# Patient Record
Sex: MALE | Race: OTHER | HISPANIC OR LATINO | URBAN - METROPOLITAN AREA
[De-identification: names, ages, dates, MRNs, and addresses within clinical notes are randomized per-mention and may not be internally consistent; named-entity substitution may affect disease eponyms.]

---

## 2020-04-10 ENCOUNTER — INPATIENT (INPATIENT)
Facility: HOSPITAL | Age: 35
LOS: 0 days | Discharge: HOME | DRG: 179 | End: 2020-04-11
Attending: INTERNAL MEDICINE | Admitting: INTERNAL MEDICINE
Payer: MEDICAID

## 2020-04-10 VITALS
DIASTOLIC BLOOD PRESSURE: 76 MMHG | OXYGEN SATURATION: 98 % | HEIGHT: 72 IN | SYSTOLIC BLOOD PRESSURE: 125 MMHG | TEMPERATURE: 98 F | RESPIRATION RATE: 18 BRPM | WEIGHT: 229.94 LBS | HEART RATE: 120 BPM

## 2020-04-10 DIAGNOSIS — R74.0 NONSPECIFIC ELEVATION OF LEVELS OF TRANSAMINASE AND LACTIC ACID DEHYDROGENASE [LDH]: ICD-10-CM

## 2020-04-10 DIAGNOSIS — Z90.49 ACQUIRED ABSENCE OF OTHER SPECIFIED PARTS OF DIGESTIVE TRACT: Chronic | ICD-10-CM

## 2020-04-10 LAB
ALBUMIN SERPL ELPH-MCNC: 4.5 G/DL — SIGNIFICANT CHANGE UP (ref 3.3–5)
ALP SERPL-CCNC: 170 U/L — HIGH (ref 40–120)
ALT FLD-CCNC: 952 U/L — HIGH (ref 10–45)
ANION GAP SERPL CALC-SCNC: 14 MMOL/L — SIGNIFICANT CHANGE UP (ref 5–17)
AST SERPL-CCNC: 767 U/L — HIGH (ref 10–40)
BASOPHILS # BLD AUTO: 0.02 K/UL — SIGNIFICANT CHANGE UP (ref 0–0.2)
BASOPHILS NFR BLD AUTO: 0.3 % — SIGNIFICANT CHANGE UP (ref 0–2)
BILIRUB SERPL-MCNC: 2.4 MG/DL — HIGH (ref 0.2–1.2)
BUN SERPL-MCNC: 10 MG/DL — SIGNIFICANT CHANGE UP (ref 7–23)
CALCIUM SERPL-MCNC: 9.6 MG/DL — SIGNIFICANT CHANGE UP (ref 8.4–10.5)
CHLORIDE SERPL-SCNC: 101 MMOL/L — SIGNIFICANT CHANGE UP (ref 96–108)
CO2 SERPL-SCNC: 21 MMOL/L — LOW (ref 22–31)
CREAT SERPL-MCNC: 0.99 MG/DL — SIGNIFICANT CHANGE UP (ref 0.5–1.3)
CRP SERPL-MCNC: 1.54 MG/DL — HIGH (ref 0–0.4)
D DIMER BLD IA.RAPID-MCNC: 7795 NG/ML DDU — HIGH
EOSINOPHIL # BLD AUTO: 0.03 K/UL — SIGNIFICANT CHANGE UP (ref 0–0.5)
EOSINOPHIL NFR BLD AUTO: 0.4 % — SIGNIFICANT CHANGE UP (ref 0–6)
FERRITIN SERPL-MCNC: 1970 NG/ML — HIGH (ref 30–400)
GLUCOSE SERPL-MCNC: 122 MG/DL — HIGH (ref 70–99)
HCT VFR BLD CALC: 47.2 % — SIGNIFICANT CHANGE UP (ref 39–50)
HGB BLD-MCNC: 16.3 G/DL — SIGNIFICANT CHANGE UP (ref 13–17)
IMM GRANULOCYTES NFR BLD AUTO: 0.4 % — SIGNIFICANT CHANGE UP (ref 0–1.5)
LACTATE SERPL-SCNC: 1.6 MMOL/L — SIGNIFICANT CHANGE UP (ref 0.7–2)
LDH SERPL L TO P-CCNC: 457 U/L — HIGH (ref 50–242)
LYMPHOCYTES # BLD AUTO: 0.31 K/UL — LOW (ref 1–3.3)
LYMPHOCYTES # BLD AUTO: 4.2 % — LOW (ref 13–44)
MCHC RBC-ENTMCNC: 29.6 PG — SIGNIFICANT CHANGE UP (ref 27–34)
MCHC RBC-ENTMCNC: 34.5 GM/DL — SIGNIFICANT CHANGE UP (ref 32–36)
MCV RBC AUTO: 85.8 FL — SIGNIFICANT CHANGE UP (ref 80–100)
MONOCYTES # BLD AUTO: 0.93 K/UL — HIGH (ref 0–0.9)
MONOCYTES NFR BLD AUTO: 12.7 % — SIGNIFICANT CHANGE UP (ref 2–14)
NEUTROPHILS # BLD AUTO: 6.01 K/UL — SIGNIFICANT CHANGE UP (ref 1.8–7.4)
NEUTROPHILS NFR BLD AUTO: 82 % — HIGH (ref 43–77)
NRBC # BLD: 0 /100 WBCS — SIGNIFICANT CHANGE UP (ref 0–0)
PLATELET # BLD AUTO: 228 K/UL — SIGNIFICANT CHANGE UP (ref 150–400)
POTASSIUM SERPL-MCNC: 4.1 MMOL/L — SIGNIFICANT CHANGE UP (ref 3.5–5.3)
POTASSIUM SERPL-SCNC: 4.1 MMOL/L — SIGNIFICANT CHANGE UP (ref 3.5–5.3)
PROCALCITONIN SERPL-MCNC: 0.84 NG/ML — HIGH (ref 0.02–0.1)
PROT SERPL-MCNC: 7.2 G/DL — SIGNIFICANT CHANGE UP (ref 6–8.3)
RBC # BLD: 5.5 M/UL — SIGNIFICANT CHANGE UP (ref 4.2–5.8)
RBC # FLD: 12.3 % — SIGNIFICANT CHANGE UP (ref 10.3–14.5)
SARS-COV-2 RNA SPEC QL NAA+PROBE: SIGNIFICANT CHANGE UP
SODIUM SERPL-SCNC: 136 MMOL/L — SIGNIFICANT CHANGE UP (ref 135–145)
WBC # BLD: 7.33 K/UL — SIGNIFICANT CHANGE UP (ref 3.8–10.5)
WBC # FLD AUTO: 7.33 K/UL — SIGNIFICANT CHANGE UP (ref 3.8–10.5)

## 2020-04-10 PROCEDURE — 76705 ECHO EXAM OF ABDOMEN: CPT | Mod: 26

## 2020-04-10 PROCEDURE — 99221 1ST HOSP IP/OBS SF/LOW 40: CPT | Mod: AI

## 2020-04-10 PROCEDURE — 93010 ELECTROCARDIOGRAM REPORT: CPT

## 2020-04-10 PROCEDURE — 99285 EMERGENCY DEPT VISIT HI MDM: CPT

## 2020-04-10 PROCEDURE — 71045 X-RAY EXAM CHEST 1 VIEW: CPT | Mod: 26

## 2020-04-10 PROCEDURE — 74177 CT ABD & PELVIS W/CONTRAST: CPT | Mod: 26

## 2020-04-10 RX ORDER — PANTOPRAZOLE SODIUM 20 MG/1
40 TABLET, DELAYED RELEASE ORAL
Refills: 0 | Status: DISCONTINUED | OUTPATIENT
Start: 2020-04-10 | End: 2020-04-11

## 2020-04-10 RX ORDER — SODIUM CHLORIDE 9 MG/ML
1000 INJECTION INTRAMUSCULAR; INTRAVENOUS; SUBCUTANEOUS
Refills: 0 | Status: DISCONTINUED | OUTPATIENT
Start: 2020-04-10 | End: 2020-04-11

## 2020-04-10 RX ORDER — MORPHINE SULFATE 50 MG/1
2 CAPSULE, EXTENDED RELEASE ORAL EVERY 6 HOURS
Refills: 0 | Status: DISCONTINUED | OUTPATIENT
Start: 2020-04-10 | End: 2020-04-11

## 2020-04-10 RX ORDER — SODIUM CHLORIDE 9 MG/ML
1000 INJECTION INTRAMUSCULAR; INTRAVENOUS; SUBCUTANEOUS ONCE
Refills: 0 | Status: COMPLETED | OUTPATIENT
Start: 2020-04-10 | End: 2020-04-10

## 2020-04-10 RX ORDER — ACETAMINOPHEN 500 MG
975 TABLET ORAL ONCE
Refills: 0 | Status: COMPLETED | OUTPATIENT
Start: 2020-04-10 | End: 2020-04-10

## 2020-04-10 RX ORDER — ENOXAPARIN SODIUM 100 MG/ML
40 INJECTION SUBCUTANEOUS DAILY
Refills: 0 | Status: DISCONTINUED | OUTPATIENT
Start: 2020-04-10 | End: 2020-04-11

## 2020-04-10 RX ADMIN — SODIUM CHLORIDE 70 MILLILITER(S): 9 INJECTION INTRAMUSCULAR; INTRAVENOUS; SUBCUTANEOUS at 17:55

## 2020-04-10 RX ADMIN — SODIUM CHLORIDE 1000 MILLILITER(S): 9 INJECTION INTRAMUSCULAR; INTRAVENOUS; SUBCUTANEOUS at 13:58

## 2020-04-10 RX ADMIN — SODIUM CHLORIDE 70 MILLILITER(S): 9 INJECTION INTRAMUSCULAR; INTRAVENOUS; SUBCUTANEOUS at 22:59

## 2020-04-10 RX ADMIN — Medication 975 MILLIGRAM(S): at 13:56

## 2020-04-10 NOTE — ED ADULT NURSE NOTE - OBJECTIVE STATEMENT
Pt is a 36 yo M c/o RLQ abdominal pain 8/10. PSH- cholecystectomy. Pt endorses abdominal pain since yesterday and diarrhea since 9AM today after taking "something for constipation" in an attempt to relieve pain, denies N/V. Also took unknown medication for pain (tylenol or motrin) without relief. Pain localized to RLQ no radiation. No tenderness on palpation, no rebound tenderness. Abdomen softly distended. Pt is a A&Ox4, anxious, lung sounds clear, pulses +2, tachycardic to 118, REYES 5/5, skin intact. Pt instructed on use of call bell and to wait for assistance, verbalizes understanding. Side rails up for safety, will continue to monitor. Pt is a 36 yo M c/o RLQ abdominal pain 8/10. PSH- cholecystectomy. Pt endorses abdominal pain since yesterday and diarrhea since 9AM today after taking magnesium citrate in an attempt to relieve pain thinking it was constipation, denies N/V. Endorses taking 2 tylenol for pain without relief. Pain localized to RLQ no radiation. No tenderness on palpation, no rebound tenderness. Abdomen softly distended. Pt is a A&Ox4, anxious, lung sounds clear, pulses +2, tachycardic to 118, REYES 5/5, skin intact. Pt instructed on use of call bell and to wait for assistance, verbalizes understanding. Side rails up for safety, will continue to monitor.

## 2020-04-10 NOTE — ED PROVIDER NOTE - ATTENDING CONTRIBUTION TO CARE
Agree with above, Mandeep Lao MD, FACEP  This patient was seen during the time of the COVID-19 pandemic, the care of this patient was in support of the [facility] countermeasures to COVID-19.   will get iv, cbc, cmp, ultrasound right upper quadrant, analgesia and antiemetic prn, ct a/p with iv contrast  noted elevated lft and bili  concern for choledocholithiasis with possibility for cholangitis vs COVID-19 infection with transaminitis   Will follow up on labs, analgesia, imaging, reassess and disposition to the inpatient team as clinically indicated.   Based on patient's history and physical exam, as well as the results of today's workup, I feel that patient warrants admission to the hospital for further workup/evaluation and continued management. I discussed the findings of today's workup with the patient and addressed the patient's questions and concerns. The patient was agreeable with admission. Our team spoke with the inpatient receiving team who accepted the patient for admission and subsequently took over the patient's care at the time of admission.

## 2020-04-10 NOTE — H&P ADULT - NSHPPHYSICALEXAM_GEN_ALL_CORE
PHYSICAL EXAMINATION:  Vital Signs Last 24 Hrs  T(C): 37.2 (10 Apr 2020 16:30), Max: 37.2 (10 Apr 2020 13:34)  T(F): 98.9 (10 Apr 2020 16:30), Max: 98.9 (10 Apr 2020 13:34)  HR: 102 (10 Apr 2020 16:30) (102 - 120)  BP: 125/77 (10 Apr 2020 16:30) (125/76 - 133/89)  BP(mean): --  RR: 18 (10 Apr 2020 16:30) (18 - 20)  SpO2: 98% (10 Apr 2020 16:30) (98% - 99%)  CAPILLARY BLOOD GLUCOSE            GENERAL: NAD, well-groomed,  HEAD:  atraumatic, normocephalic  EYES: sclera anicteric  ENMT: mucous membranes moist  NECK: supple, No JVD  CHEST/LUNG: clear to auscultation bilaterally;    no      rales   ,   no rhonchi,   HEART: normal S1, S2  ABDOMEN: BS+, soft, ND,  mild  right sided  tenderness/  no guarding/  rebound  EXTREMITIES:    no    edema    b/l LEs  NEURO: awake, ,     moves all extremities  SKIN: no     rash

## 2020-04-10 NOTE — ED PROVIDER NOTE - NS ED ROS FT
GENERAL: No fever or chills  EYES: no change in vision  HEENT: no trouble swallowing or speaking  CARDIAC: no chest pain or palpitations  PULMONARY: no cough or SOB  GI: abd pain, diarrhea   : No changes in urination  SKIN: no rashes  NEURO: no headache, numbness, or weakness  MSK: No joint pain

## 2020-04-10 NOTE — ED PROVIDER NOTE - CLINICAL SUMMARY MEDICAL DECISION MAKING FREE TEXT BOX
Patient presenting abd pain and laxative induced diarrhea. Abd benign, no tenderness, patient well appearing. Noted with tachycardia likely 2/2 dehydration from diarrhea, anxiety. WIll hydrate, check screening labs. Likely self limited benign abd process

## 2020-04-10 NOTE — ED ADULT NURSE NOTE - CHPI ED NUR SYMPTOMS NEG
no nausea/no vomiting/no hematuria/no blood in stool/no burning urination/no chills/no dysuria/no fever

## 2020-04-10 NOTE — ED PROVIDER NOTE - CARE PLAN
Principal Discharge DX:	Transaminitis Principal Discharge DX:	Transaminitis  Secondary Diagnosis:	Abdominal pain

## 2020-04-10 NOTE — H&P ADULT - ASSESSMENT
35M with no pmh,      hx, prior   cholecystectomy,    presenting with episodes of right sided abd pain., yesterday  an d today  denies  fevers/  chills/  cp/  sob/  no  sick contacts,  he  thought he felt constipated and took laxatives and began developing diarrhea today.    pt with normal wbc/  afebrile  elevtaed  LFTs/  ,  CT  abdomen, no pathology found/  normal  ducts/  normal  liver/  fluid  in colon  house  gi  eval    lfts in am/  hepatitis  profile    ast/alt  767/  952/ Alk phos,  170   diarrhea after taking laxatives,  will  monitor   ID  eval   COVID/  inflammatory  markers  in progress  i  fluids.  dvt  ppx      < from: CT Abdomen and Pelvis w/ IV Cont (04.10.20 @ 15:49) >  IMPRESSION:   Fluid-filled throughout the colon, in keeping with the clinical history of diarrhea.  Trace nonspecific free fluid in the pelvis.  Cholecystectomy.   Otherwise unremarkable exam.  < end of copied text > 35M with no pmh,      hx, prior   cholecystectomy,    presenting with episodes of right sided abd pain., yesterday  an d today  denies  fevers/  chills/  cp/  sob/  no  sick contacts,  he  thought he felt constipated and took laxatives and began developing diarrhea today.    pt with normal wbc/  afebrile  elevtaed  LFTs/  ,  CT  abdomen, no pathology found/  normal  ducts/  normal  liver/  fluid  in colon  house  gi  eval    lfts in am/  hepatitis  profile    ast/alt  767/  952/ Alk phos,  170   diarrhea after taking laxatives,  will  monitor   ID  eval   COVID/  inflammatory  markers  in progress  i  fluids.  dvt  ppx      addendum  7.50  pm  Covid  negative/  markers  inflated, including crp/ D dimer/  ferritin, /  procalcitonin   CtA  chest,  ordered  rpt  covid  test,  ordered      < from: CT Abdomen and Pelvis w/ IV Cont (04.10.20 @ 15:49) >  IMPRESSION:   Fluid-filled throughout the colon, in keeping with the clinical history of diarrhea.  Trace nonspecific free fluid in the pelvis.  Cholecystectomy.   Otherwise unremarkable exam.  < end of copied text >

## 2020-04-10 NOTE — H&P ADULT - HISTORY OF PRESENT ILLNESS
35M with no pmh,     hx cholecystectomy presenting with episodes of right sided abd pain.  1 episode yesterday and return of symptoms today . States thought he felt constipated and took laxatives and began developing diarrhea today.  Denies fever, chills, cp, sob, nausea, vomiting, dysuria

## 2020-04-10 NOTE — ED PROVIDER NOTE - OBJECTIVE STATEMENT
35M with no pmh, hx cholecystectomy presenting with episodes of right sided abd pain. 1 episode yesterday and return of symptoms today. States thought he felt constipated and took laxatives and began developing diarrhea today. Denies fever, chills, cp, sob, nausea, vomiting, dysuria

## 2020-04-10 NOTE — H&P ADULT - NSHPLABSRESULTS_GEN_ALL_CORE
LABS:                        16.3   7.33  )-----------( 228      ( 10 Apr 2020 13:30 )             47.2     04-10    136  |  101  |  10  ----------------------------<  122<H>  4.1   |  21<L>  |  0.99    Ca    9.6      10 Apr 2020 13:30    TPro  7.2  /  Alb  4.5  /  TBili  2.4<H>  / < from: CT Abdomen and Pelvis w/ IV Cont (04.10.20 @ 15:49) >    MPRESSION:     Fluid-filled throughout the colon, in keeping with the clinical history of diarrhea.    Trace nonspecific free fluid in the pelvis.    Cholecystectomy.     Otherwise unremarkable exam.                < end of copied text >     DBili  x   /  AST  767<H>  /  ALT  952<H>  /  AlkPhos  170<H>  04-10

## 2020-04-10 NOTE — H&P ADULT - NSHPREVIEWOFSYSTEMS_GEN_ALL_CORE
REVIEW OF SYSTEMS:  GEN: no fever,    no chills  RESP: no SOB,   no cough  CVS: no chest pain,   no palpitations  GI:abdominal pain,   no nausea,   no vomiting,   no constipation,   no diarrhea  : no dysuria,   no frequency  NEURO: no headache,   no dizziness  PSYCH: no depression,   not anxious  Derm : no rash

## 2020-04-10 NOTE — ED PROCEDURE NOTE - PROCEDURE ADDITIONAL DETAILS
POCUS: Emergency Department Focused Ultrasound performed at patient's bedside.  The complete report may be available in PACS, see below for findings.  cbd was not appreciated to be dilated

## 2020-04-11 ENCOUNTER — TRANSCRIPTION ENCOUNTER (OUTPATIENT)
Age: 35
End: 2020-04-11

## 2020-04-11 VITALS
RESPIRATION RATE: 20 BRPM | TEMPERATURE: 98 F | DIASTOLIC BLOOD PRESSURE: 83 MMHG | OXYGEN SATURATION: 98 % | HEART RATE: 96 BPM | SYSTOLIC BLOOD PRESSURE: 118 MMHG

## 2020-04-11 LAB
ALBUMIN SERPL ELPH-MCNC: 4.1 G/DL — SIGNIFICANT CHANGE UP (ref 3.3–5)
ALP SERPL-CCNC: 172 U/L — HIGH (ref 40–120)
ALT FLD-CCNC: 617 U/L — HIGH (ref 10–45)
ANION GAP SERPL CALC-SCNC: 14 MMOL/L — SIGNIFICANT CHANGE UP (ref 5–17)
AST SERPL-CCNC: 230 U/L — HIGH (ref 10–40)
BILIRUB DIRECT SERPL-MCNC: 0.5 MG/DL — HIGH (ref 0–0.2)
BILIRUB INDIRECT FLD-MCNC: 1.2 MG/DL — HIGH (ref 0.2–1)
BILIRUB SERPL-MCNC: 1.7 MG/DL — HIGH (ref 0.2–1.2)
BUN SERPL-MCNC: 7 MG/DL — SIGNIFICANT CHANGE UP (ref 7–23)
CALCIUM SERPL-MCNC: 9 MG/DL — SIGNIFICANT CHANGE UP (ref 8.4–10.5)
CHLORIDE SERPL-SCNC: 103 MMOL/L — SIGNIFICANT CHANGE UP (ref 96–108)
CO2 SERPL-SCNC: 20 MMOL/L — LOW (ref 22–31)
CREAT SERPL-MCNC: 0.97 MG/DL — SIGNIFICANT CHANGE UP (ref 0.5–1.3)
GLUCOSE SERPL-MCNC: 89 MG/DL — SIGNIFICANT CHANGE UP (ref 70–99)
HCT VFR BLD CALC: 41.1 % — SIGNIFICANT CHANGE UP (ref 39–50)
HGB BLD-MCNC: 13.8 G/DL — SIGNIFICANT CHANGE UP (ref 13–17)
MCHC RBC-ENTMCNC: 29.5 PG — SIGNIFICANT CHANGE UP (ref 27–34)
MCHC RBC-ENTMCNC: 33.6 GM/DL — SIGNIFICANT CHANGE UP (ref 32–36)
MCV RBC AUTO: 87.8 FL — SIGNIFICANT CHANGE UP (ref 80–100)
MITOCHONDRIA AB SER-ACNC: SIGNIFICANT CHANGE UP
NRBC # BLD: 0 /100 WBCS — SIGNIFICANT CHANGE UP (ref 0–0)
PLATELET # BLD AUTO: 178 K/UL — SIGNIFICANT CHANGE UP (ref 150–400)
POTASSIUM SERPL-MCNC: 3.9 MMOL/L — SIGNIFICANT CHANGE UP (ref 3.5–5.3)
POTASSIUM SERPL-SCNC: 3.9 MMOL/L — SIGNIFICANT CHANGE UP (ref 3.5–5.3)
PROT SERPL-MCNC: 6.7 G/DL — SIGNIFICANT CHANGE UP (ref 6–8.3)
RBC # BLD: 4.68 M/UL — SIGNIFICANT CHANGE UP (ref 4.2–5.8)
RBC # FLD: 12.7 % — SIGNIFICANT CHANGE UP (ref 10.3–14.5)
SARS-COV-2 RNA SPEC QL NAA+PROBE: DETECTED
SMOOTH MUSCLE AB SER-ACNC: ABNORMAL
SODIUM SERPL-SCNC: 137 MMOL/L — SIGNIFICANT CHANGE UP (ref 135–145)
WBC # BLD: 4.78 K/UL — SIGNIFICANT CHANGE UP (ref 3.8–10.5)
WBC # FLD AUTO: 4.78 K/UL — SIGNIFICANT CHANGE UP (ref 3.8–10.5)

## 2020-04-11 PROCEDURE — 87635 SARS-COV-2 COVID-19 AMP PRB: CPT

## 2020-04-11 PROCEDURE — 99232 SBSQ HOSP IP/OBS MODERATE 35: CPT

## 2020-04-11 PROCEDURE — 80076 HEPATIC FUNCTION PANEL: CPT

## 2020-04-11 PROCEDURE — 83605 ASSAY OF LACTIC ACID: CPT

## 2020-04-11 PROCEDURE — 83615 LACTATE (LD) (LDH) ENZYME: CPT

## 2020-04-11 PROCEDURE — 80048 BASIC METABOLIC PNL TOTAL CA: CPT

## 2020-04-11 PROCEDURE — 86140 C-REACTIVE PROTEIN: CPT

## 2020-04-11 PROCEDURE — 85379 FIBRIN DEGRADATION QUANT: CPT

## 2020-04-11 PROCEDURE — 86038 ANTINUCLEAR ANTIBODIES: CPT

## 2020-04-11 PROCEDURE — 85027 COMPLETE CBC AUTOMATED: CPT

## 2020-04-11 PROCEDURE — 83690 ASSAY OF LIPASE: CPT

## 2020-04-11 PROCEDURE — 86255 FLUORESCENT ANTIBODY SCREEN: CPT

## 2020-04-11 PROCEDURE — 93005 ELECTROCARDIOGRAM TRACING: CPT

## 2020-04-11 PROCEDURE — 71045 X-RAY EXAM CHEST 1 VIEW: CPT

## 2020-04-11 PROCEDURE — 84145 PROCALCITONIN (PCT): CPT

## 2020-04-11 PROCEDURE — 82728 ASSAY OF FERRITIN: CPT

## 2020-04-11 PROCEDURE — 76705 ECHO EXAM OF ABDOMEN: CPT

## 2020-04-11 PROCEDURE — 99285 EMERGENCY DEPT VISIT HI MDM: CPT

## 2020-04-11 PROCEDURE — 71275 CT ANGIOGRAPHY CHEST: CPT

## 2020-04-11 PROCEDURE — 86381 MITOCHONDRIAL ANTIBODY EACH: CPT

## 2020-04-11 PROCEDURE — 80053 COMPREHEN METABOLIC PANEL: CPT

## 2020-04-11 PROCEDURE — 74177 CT ABD & PELVIS W/CONTRAST: CPT

## 2020-04-11 RX ORDER — ENOXAPARIN SODIUM 100 MG/ML
40 INJECTION SUBCUTANEOUS
Refills: 0 | Status: DISCONTINUED | OUTPATIENT
Start: 2020-04-11 | End: 2020-04-11

## 2020-04-11 RX ORDER — HYDROXYCHLOROQUINE SULFATE 200 MG
400 TABLET ORAL EVERY 24 HOURS
Refills: 0 | Status: DISCONTINUED | OUTPATIENT
Start: 2020-04-12 | End: 2020-04-11

## 2020-04-11 RX ORDER — ACETAMINOPHEN 500 MG
2 TABLET ORAL
Qty: 0 | Refills: 0 | DISCHARGE

## 2020-04-11 RX ORDER — ENOXAPARIN SODIUM 100 MG/ML
40 INJECTION SUBCUTANEOUS DAILY
Refills: 0 | Status: DISCONTINUED | OUTPATIENT
Start: 2020-04-11 | End: 2020-04-11

## 2020-04-11 RX ORDER — HYDROXYCHLOROQUINE SULFATE 200 MG
TABLET ORAL
Refills: 0 | Status: DISCONTINUED | OUTPATIENT
Start: 2020-04-11 | End: 2020-04-11

## 2020-04-11 RX ORDER — HYDROXYCHLOROQUINE SULFATE 200 MG
800 TABLET ORAL EVERY 24 HOURS
Refills: 0 | Status: COMPLETED | OUTPATIENT
Start: 2020-04-11 | End: 2020-04-11

## 2020-04-11 RX ADMIN — Medication 800 MILLIGRAM(S): at 13:48

## 2020-04-11 RX ADMIN — PANTOPRAZOLE SODIUM 40 MILLIGRAM(S): 20 TABLET, DELAYED RELEASE ORAL at 04:33

## 2020-04-11 NOTE — CONSULT NOTE ADULT - SUBJECTIVE AND OBJECTIVE BOX
HPI:   Patient is a 35y male who had cholecystectomy and after had some gall stones passed. He was fine until yesterday when he developed abdomen pain, came to the hospital and found to have liver enzyme elevation . He had an us with no stones or dilated ducts seen and the ct abd and pelvis showed no hepatobiliary changes except cholecystectomy. CT chest no infiltrates. He has high inflammatory parameters and covid sent and is positive on second test. He currently feels fine. No sob, cp, n,v,d,ha, body aches. His abdo pain resolved.     REVIEW OF SYSTEMS:  All other review of systems negative (Comprehensive ROS)    PAST MEDICAL & SURGICAL HISTORY:  No pertinent past medical history  History of cholecystectomy      Allergies    No Known Allergies    Intolerances        Antimicrobials Day #  :  hydroxychloroquine   Oral     Other Medications:  enoxaparin Injectable 40 milliGRAM(s) SubCutaneous daily  pantoprazole    Tablet 40 milliGRAM(s) Oral before breakfast      FAMILY HISTORY:      SOCIAL HISTORY:  Smoking: [ ]Yes [ x]No  ETOH: [ ]Yes [ ]xNo  Drug Use: [ ]Yes [ x]No   [ ] Single[ ]x    T(F): 98 (04-11-20 @ 12:42), Max: 98.9 (04-10-20 @ 16:30)  HR: 96 (04-11-20 @ 12:42)  BP: 118/83 (04-11-20 @ 12:42)  RR: 20 (04-11-20 @ 12:42)  SpO2: 98% (04-11-20 @ 12:42)  Wt(kg): --    PHYSICAL EXAM:  General: alert, no acute distress  Eyes:  anicteric, no conjunctival injection, no discharge  Oropharynx: no lesions or injection 	  Neck: supple, without adenopathy  Lungs: clear to auscultation  Heart: regular rate and rhythm; no murmur, rubs or gallops  Abdomen: soft, nondistended, nontender, without mass or organomegaly  Skin: no lesions  Extremities: no clubbing, cyanosis, or edema  Neurologic: alert, oriented, moves all extremities    LAB RESULTS:                        13.8   4.78  )-----------( 178      ( 11 Apr 2020 06:42 )             41.1     04-11    137  |  103  |  7   ----------------------------<  89  3.9   |  20<L>  |  0.97    Ca    9.0      11 Apr 2020 06:42    TPro  6.7  /  Alb  4.1  /  TBili  1.7<H>  /  DBili  0.5<H>  /  AST  230<H>  /  ALT  617<H>  /  AlkPhos  172<H>  04-11    LIVER FUNCTIONS - ( 11 Apr 2020 06:42 )  Alb: 4.1 g/dL / Pro: 6.7 g/dL / ALK PHOS: 172 U/L / ALT: 617 U/L / AST: 230 U/L / GGT: x         Ferritin, Serum: 1970 ng/mL (04.10.20 @ 19:00)    C-Reactive Protein, Serum: 1.54 mg/dL (04.10.20 @ 19:00)    D-Dimer Assay, Quantitative: 7795: D-Dimer result less than 230 ng/mL DDU correlates with the absence  of thrombosis in a patient with a low and moderate       pre-test probability of thrombosis.  1 DDU is approximately equal to  2 ng/mL FEU (previous units). ng/mL DDU (04.10.20 @ 17:00)            MICROBIOLOGY:  RECENT CULTURES:        RADIOLOGY REVIEWED:  < from: CT Abdomen and Pelvis w/ IV Cont (04.10.20 @ 15:49) >  XAM:  CT ABDOMEN AND PELVIS IC                            PROCEDURE DATE:  04/10/2020            INTERPRETATION:  CLINICAL INFORMATION: Right-sided abdominal pain. Abnormal liver function tests. Cholecystectomy 1 year ago. Diarrhea.    COMPARISON:None.    PROCEDURE:   CT of the Abdomen and Pelvis was performed with intravenous contrast.   Intravenous contrast: 90 ml Omnipaque 350. 10 ml discarded.  Oral contrast: None.  Sagittal and coronal reformats were performed.    FINDINGS:    LOWER CHEST: Within normal limits.    LIVER: Within normal limits.  BILE DUCTS: Normal caliber.  GALLBLADDER: Cholecystectomy.  SPLEEN: Within normal limits.  PANCREAS: Within normal limits.  ADRENALS: Within normal limits.  KIDNEYS/URETERS: Within normal limits.    BLADDER: Within normal limits.  REPRODUCTIVE ORGANS: Prostate within normal limits.    BOWEL: No bowel obstruction. Appendix is normal. Fluid is present in the colon extending from the cecum to the rectum.  PERITONEUM: Trace free fluid in the pelvis.  VESSELS: Within normal limits.  RETROPERITONEUM/LYMPH NODES: No lymphadenopathy.    ABDOMINAL WALL: Within normal limits.  BONES: Within normal limits.    IMPRESSION:     Fluid-filled throughout the colon, in keeping with the clinical history of diarrhea.    Trace nonspecific free fluid in the pelvis.    Cholecystectomy.     < end of copied text >    < from: CT Angio Chest w/ IV Cont (04.10.20 @ 21:21) >    EXAM:  CT ANGIO CHEST (W)AW IC                            PROCEDURE DATE:  04/10/2020            INTERPRETATION:  Clinical information: Evaluate for pulmonary embolus.    CT angiogram of the chest was obtained following administration of intravenous contrast. Approximately 65 cc of Omnipaque 350 was administered and 45 cc was discarded. Coronal, sagittal and MIP images were submitted for review.    No hilar and/or mediastinal adenopathy is noted.     Heart is normal in size. No pericardial effusion is noted. Pulmonary arteries are normal in caliber. Evaluation of the pulmonary arteries is limited due to transient interruption of contrast.     No endobronchial lesions are noted. Lungs are clear. No pleural effusions are noted.    For interpretation of the abdominal contents, please see the report of the CT scan of the abdomen performed earlier on the same day.     Visualized osseous structures are within normal limits.    Impression: Nondiagnostic evaluation of the pulmonary arteries dueto transient interruption of contrast during the acquisition of images. A repeat CT angiogram is recommended for further evaluation after renal function has been assessed.    < from: US Abdomen Limited (ED) (04.10.20 @ 16:57) >  Procedure was performed in the Emergency Department by a credentialed Emergency Medicine Attending Physician    EXAM:  ER US ABDOMEN LTD      ORDER COMMENTS:      PROCEDURE DATE:  04/10/2020    FOCUSED ED ULTRASOUND REPORT          INTERPRETATION:Grayscale imaging of the right upper quadrant was performed.    The gallbladder was visualized and scanned in longitudinal and transverse planes.  The patient was status post cholecystectomy.  The common bile duct measured approximately 0.4 cm.      IMPRESSION:The common bile duct was not noted to be dilated.        < end of copied text >      < end of copied text >        Impression:  Patient with covid infection , presented with hepatitis as main issue. It is possible he passed a gall stone and now liver enzymes are improved and he has early covid infection with high d dimer and ferritin and lymphopenia. This could be the best time to treat a patient  since it is likely the viral phase as opposed to the inflammatory phase.   Recommendations:  would favor 5 days total of hydroxychloroquin  He is for discharge but must f/u labs with his pcp, avoid etoh and any other potential hepatotoxins  needs 4 more days of 400mg daily plaquenil to start tomorrow since he got a dose today  must return if any sob, fever, recurrent pain.

## 2020-04-11 NOTE — DISCHARGE NOTE PROVIDER - HOSPITAL COURSE
35M with no pmh, hx cholecystectomy presenting with episodes of right sided abd pain. Patient reported he felt constipated took laxative began to develop diarrhea. Elevated d-dimer CTA negative for PE. Elevated LFTs. Covid-19 positive. Afebrile. No oxygen requirements. Room air O2 sat 98%. Started on Plaquenil. Cleared for discharge by attending.

## 2020-04-11 NOTE — PROGRESS NOTE ADULT - ASSESSMENT
35M with no pmh,      hx, prior   cholecystectomy,    presenting with episodes of right sided abd pain., yesterday  an d today  denies  fevers/  chills/  cp/  sob/  no  sick contacts,  he  thought he felt constipated and took laxatives and began developing diarrhea today.    pt with normal wbc/  afebrile  elevtaed  LFTs/  ,  CT  abdomen, no pathology found/  normal  ducts/  normal  liver/  fluid  in colon  house  gi  eval,   follow  lfts/  hepatitis  profile    ast/alt  767/  952/ Alk phos,  170   diarrhea after taking laxatives,    COVID  positive,   inflammatory  markers  , now  positive    iv   fluids.  dvt  ppx  ct  a  chest,  non diagnostic/  no   indication  to repeat, now  that  pt is  Covid  psoitive    rx  plan per  ID  lovenox  bid/  BMI  over 30      < from: CT Abdomen and Pelvis w/ IV Cont (04.10.20 @ 15:49) >  IMPRESSION:   Fluid-filled throughout the colon, in keeping with the clinical history of diarrhea.  Trace nonspecific free fluid in the pelvis.  Cholecystectomy.   Otherwise unremarkable exam.  < end of copied text > 35M with no pmh,      hx, prior   cholecystectomy,    presenting with episodes of right sided abd pain., yesterday  an d today  denies  fevers/  chills/  cp/  sob/  no  sick contacts,  he  thought he felt constipated and took laxatives and began developing diarrhea today.    pt with normal wbc/  afebrile  elevtaed  LFTs/  ,  CT  abdomen, no pathology found/  normal  ducts/  normal  liver/  fluid  in colon  house  gi  eval,   follow  lfts/  hepatitis  profile    ast/alt  767/  952/ Alk phos,  170   diarrhea after taking laxatives,    COVID  positive,   inflammatory  markers  , now  positive    iv   fluids.  dvt  ppx/  lovenox  ct  a  chest,  non diagnostic/  no   indication  to repeat, now  that  pt is  Covid  positive  and    room air  sat is  98    rx  plan per  ID        < from: CT Abdomen and Pelvis w/ IV Cont (04.10.20 @ 15:49) >  IMPRESSION:   Fluid-filled throughout the colon, in keeping with the clinical history of diarrhea.  Trace nonspecific free fluid in the pelvis.  Cholecystectomy.   Otherwise unremarkable exam.  < end of copied text > 35M with no pmh,      hx, prior   cholecystectomy,    presenting with episodes of right sided abd pain., yesterday  an d today  denies  fevers/  chills/  cp/  sob/  no  sick contacts,  he  thought he felt constipated and took laxatives and began developing diarrhea today.    pt with normal wbc/  afebrile  elevtaed  LFTs/  ,  CT  abdomen, no pathology found/  normal  ducts/  normal  liver/  fluid  in colon    follow  lfts/  hepatitis  profile    ast/alt  767/  952/ Alk phos,  170   diarrhea after taking laxatives, resolved  now    COVID  positive,   inflammatory  markers  , now  positive    iv   fluids.  dvt  ppx/  lovenox  ct  a  chest,  non diagnostic/  no   indication  to repeat, now  that  pt is  Covid  positive  and    room air  sat is  98    rx  plan per  ID  pt  wants to  go  home    d/c  after seen by ID        < from: CT Abdomen and Pelvis w/ IV Cont (04.10.20 @ 15:49) >  IMPRESSION:   Fluid-filled throughout the colon, in keeping with the clinical history of diarrhea.  Trace nonspecific free fluid in the pelvis.  Cholecystectomy.   Otherwise unremarkable exam.  < end of copied text >

## 2020-04-11 NOTE — DISCHARGE NOTE PROVIDER - NSDCMRMEDTOKEN_GEN_ALL_CORE_FT
elderberry: orally once a day  Tylenol 325 mg oral tablet: 2 tab(s) orally every 4 hours, As Needed elderberry: orally once a day elderberry: orally once a day  hydroxychloroquine 200 mg oral tablet: 400 milligram(s) orally once a day x 4 days   START ON 4/12 (Received dose today 4/11)

## 2020-04-11 NOTE — DISCHARGE NOTE PROVIDER - NSDCCPCAREPLAN_GEN_ALL_CORE_FT
PRINCIPAL DISCHARGE DIAGNOSIS  Diagnosis: COVID-19 virus detected  Assessment and Plan of Treatment: Continue Plaquenil      SECONDARY DISCHARGE DIAGNOSES  Diagnosis: Abdominal pain  Assessment and Plan of Treatment: resolved    Diagnosis: Transaminitis  Assessment and Plan of Treatment: Follow up with PCP for lab work PRINCIPAL DISCHARGE DIAGNOSIS  Diagnosis: COVID-19 virus detected  Assessment and Plan of Treatment: Continue Plaquenil      SECONDARY DISCHARGE DIAGNOSES  Diagnosis: Abdominal pain  Assessment and Plan of Treatment: resolved    Diagnosis: Transaminitis  Assessment and Plan of Treatment: Follow up with PCP for lab work  Avoid alchohol consumption PRINCIPAL DISCHARGE DIAGNOSIS  Diagnosis: COVID-19 virus detected  Assessment and Plan of Treatment: Continue Plaquenil  Self-isolate for two week      SECONDARY DISCHARGE DIAGNOSES  Diagnosis: Abdominal pain  Assessment and Plan of Treatment: resolved    Diagnosis: Transaminitis  Assessment and Plan of Treatment: Follow up with PCP for lab work  Avoid alchohol consumption

## 2020-04-11 NOTE — DISCHARGE NOTE NURSING/CASE MANAGEMENT/SOCIAL WORK - PATIENT PORTAL LINK FT
You can access the FollowMyHealth Patient Portal offered by [facility] by registering at the following website: [email or web address]. By joining [facility]’s FollowMyHealth portal, you will also be able to view your health information using other applications (apps) compatible with our system.

## 2020-04-11 NOTE — PROGRESS NOTE ADULT - SUBJECTIVE AND OBJECTIVE BOX
afebrile    REVIEW OF SYSTEMS:  GEN: no fever,    no chills  RESP: no SOB,   no cough  CVS: no chest pain,   no palpitations  GI: no abdominal pain,   no nausea,   no vomiting,   no constipation,   no diarrhea  : no dysuria,   no frequency  NEURO: no headache,   no dizziness  PSYCH: no depression,   not anxious  Derm : no rash    MEDICATIONS  (STANDING):  enoxaparin Injectable 40 milliGRAM(s) SubCutaneous daily  pantoprazole    Tablet 40 milliGRAM(s) Oral before breakfast  sodium chloride 0.9%. 1000 milliLiter(s) (70 mL/Hr) IV Continuous <Continuous>    MEDICATIONS  (PRN):  morphine  - Injectable 2 milliGRAM(s) IV Push every 6 hours PRN Moderate Pain (4 - 6)      Vital Signs Last 24 Hrs  T(C): 37.1 (11 Apr 2020 04:30), Max: 37.2 (10 Apr 2020 13:34)  T(F): 98.8 (11 Apr 2020 04:30), Max: 98.9 (10 Apr 2020 13:34)  HR: 100 (11 Apr 2020 04:30) (100 - 123)  BP: 111/68 (11 Apr 2020 04:30) (111/68 - 133/89)  BP(mean): --  RR: 18 (11 Apr 2020 04:30) (17 - 20)  SpO2: 97% (11 Apr 2020 04:30) (95% - 99%)  CAPILLARY BLOOD GLUCOSE        I&O's Summary    10 Apr 2020 07:01  -  11 Apr 2020 07:00  --------------------------------------------------------  IN: 910 mL / OUT: 300 mL / NET: 610 mL        PHYSICAL EXAM:  HEAD:  Atraumatic, Normocephalic  NECK: Supple, No   JVD  CHEST/LUNG:   no     rales,     no,    rhonchi  HEART: Regular rate and rhythm;         murmur  ABDOMEN: Soft, Nontender, ;   EXTREMITIES:  no      edema  NEUROLOGY:  alert    LABS:                        13.8   4.78  )-----------( 178      ( 11 Apr 2020 06:42 )             41.1     04-11    137  |  103  |  7   ----------------------------<  89  3.9   |  20<L>  |  0.97    Ca    9.0      11 Apr 2020 06:42    TPro  6.7  /  Alb  4.1  /  TBili  1.7<H>  /  DBili  0.5<H>  /  AST  230<H>  /  ALT  617<H>  /  AlkPhos  172<H>  04-11            Lactate, Blood: 1.6 mmol/L (04-10 @ 17:53)                  Consultant(s) Notes Reviewed:      Care Discussed with Consultants/Other Providers: afebrile/  appears well   no  complaints    wants to go home    REVIEW OF SYSTEMS:  GEN: no fever,    no chills  RESP: no SOB,   no cough  CVS: no chest pain,   no palpitations  GI: no abdominal pain,   no nausea,   no vomiting,   no constipation,   no diarrhea  : no dysuria,   no frequency  NEURO: no headache,   no dizziness  PSYCH: no depression,   not anxious  Derm : no rash    MEDICATIONS  (STANDING):  enoxaparin Injectable 40 milliGRAM(s) SubCutaneous daily  pantoprazole    Tablet 40 milliGRAM(s) Oral before breakfast  sodium chloride 0.9%. 1000 milliLiter(s) (70 mL/Hr) IV Continuous <Continuous>    MEDICATIONS  (PRN):  morphine  - Injectable 2 milliGRAM(s) IV Push every 6 hours PRN Moderate Pain (4 - 6)      Vital Signs Last 24 Hrs  T(C): 37.1 (11 Apr 2020 04:30), Max: 37.2 (10 Apr 2020 13:34)  T(F): 98.8 (11 Apr 2020 04:30), Max: 98.9 (10 Apr 2020 13:34)  HR: 100 (11 Apr 2020 04:30) (100 - 123)  BP: 111/68 (11 Apr 2020 04:30) (111/68 - 133/89)  BP(mean): --  RR: 18 (11 Apr 2020 04:30) (17 - 20)  SpO2: 97% (11 Apr 2020 04:30) (95% - 99%)  CAPILLARY BLOOD GLUCOSE        I&O's Summary    10 Apr 2020 07:01  -  11 Apr 2020 07:00  --------------------------------------------------------  IN: 910 mL / OUT: 300 mL / NET: 610 mL        PHYSICAL EXAM:  HEAD:  Atraumatic, Normocephalic  NECK: Supple, No   JVD  CHEST/LUNG:   no     rales,     no,    rhonchi  HEART: Regular rate and rhythm;         murmur  ABDOMEN: Soft, Nontender, ;   EXTREMITIES:  no      edema  NEUROLOGY:  alert    LABS:                        13.8   4.78  )-----------( 178      ( 11 Apr 2020 06:42 )             41.1     04-11    137  |  103  |  7   ----------------------------<  89  3.9   |  20<L>  |  0.97    Ca    9.0      11 Apr 2020 06:42    TPro  6.7  /  Alb  4.1  /  TBili  1.7<H>  /  DBili  0.5<H>  /  AST  230<H>  /  ALT  617<H>  /  AlkPhos  172<H>  04-11            Lactate, Blood: 1.6 mmol/L (04-10 @ 17:53)                  Consultant(s) Notes Reviewed:      Care Discussed with Consultants/Other Providers:

## 2020-04-11 NOTE — DISCHARGE NOTE PROVIDER - NSDCFUADDAPPT_GEN_ALL_CORE_FT
[facility] will follow up with you [facility] will follow up with you  Follow up with PCP Kulara Water will follow up with you in the next few weeks  [facility] [phone] [employer] will follow up with you in the next few weeks  [facility] [phone]

## 2020-04-12 RX ORDER — HYDROXYCHLOROQUINE SULFATE 200 MG
400 TABLET ORAL
Qty: 1600 | Refills: 0
Start: 2020-04-12 | End: 2020-04-15

## 2020-04-13 LAB — ANA TITR SER: NEGATIVE — SIGNIFICANT CHANGE UP

## 2022-09-19 PROBLEM — Z78.9 OTHER SPECIFIED HEALTH STATUS: Chronic | Status: ACTIVE | Noted: 2020-04-10

## 2022-09-30 PROBLEM — Z00.00 ENCOUNTER FOR PREVENTIVE HEALTH EXAMINATION: Status: ACTIVE | Noted: 2022-09-30

## 2022-10-03 ENCOUNTER — APPOINTMENT (OUTPATIENT)
Dept: GASTROENTEROLOGY | Facility: CLINIC | Age: 37
End: 2022-10-03

## 2022-10-03 VITALS
HEIGHT: 70 IN | OXYGEN SATURATION: 98 % | DIASTOLIC BLOOD PRESSURE: 87 MMHG | TEMPERATURE: 98.3 F | WEIGHT: 220 LBS | SYSTOLIC BLOOD PRESSURE: 126 MMHG | HEART RATE: 81 BPM | BODY MASS INDEX: 31.5 KG/M2

## 2022-10-03 DIAGNOSIS — R10.9 UNSPECIFIED ABDOMINAL PAIN: ICD-10-CM

## 2022-10-03 DIAGNOSIS — R10.11 RIGHT UPPER QUADRANT PAIN: ICD-10-CM

## 2022-10-03 PROCEDURE — 99204 OFFICE O/P NEW MOD 45 MIN: CPT

## 2022-10-03 NOTE — ASSESSMENT
[FreeTextEntry1] : Gallstone induced acute cholecystitis and pancreatitis status postcholecystectomy 4 years ago presenting with on and off mild to moderate right upper quadrant discomfort, will rule out sludge versus  residual stones.\par

## 2022-10-03 NOTE — HISTORY OF PRESENT ILLNESS
[FreeTextEntry1] : Theron Edmonds is a 37-year-old  male  at [facility] had gallstone induced acute cholecystitis 4 years ago and had cholecystectomy at [facility].  A day later he developed right upper quadrant pain and fever found to have acute pancreatitis related to CBD stones.  Same hospitalization he had ERCP and stone extractions possible sphincterotomy.  Recovered in 7 days without any complications.  Since then he complains of mild to moderate right upper quadrant pain radiating to the back but without any nausea, vomiting, fever, chills or any episodes of jaundice.  His appetite and weight are stable since then.  He was also told to have high triglycerides and advised dietary changes.  No family history of hepatal biliary malignancies.  No history of GI cancer\par He has been drinking alcohol on a daily basis for the past 20 years mostly on weekends\par He smokes weed and [place]  occasionally

## 2022-10-03 NOTE — PHYSICAL EXAM
[Alert] : alert [Normal Voice/Communication] : normal voice/communication [Healthy Appearing] : healthy appearing [No Acute Distress] : no acute distress [Sclera] : the sclera and conjunctiva were normal [Hearing Threshold Finger Rub Not [place]] : hearing was normal [Normal Lips/Gums] : the lips and gums were normal [Oropharynx] : the oropharynx was normal [Normal Appearance] : the appearance of the neck was normal [No Neck Mass] : no neck mass was observed [No Respiratory Distress] : no respiratory distress [No Acc Muscle Use] : no accessory muscle use [Respiration, Rhythm And Depth] : normal respiratory rhythm and effort [Auscultation Breath Sounds / Voice Sounds] : lungs were clear to auscultation bilaterally [Heart Rate And Rhythm] : heart rate was normal and rhythm regular [Normal S1, S2] : normal S1 and S2 [Murmurs] : no murmurs [None] : no edema [Bowel Sounds] : normal bowel sounds [Abdomen Tenderness] : non-tender [No Masses] : no abdominal mass palpated [Abdomen Soft] : soft [] : no hepatosplenomegaly [Ascites: ___] : no ascites [Rebound Tenderness] : no rebound tenderness [Supraclavicular Lymph Nodes Enlarged Bilaterally] : no supraclavicular lymphadenopathy [Cervical Lymph Nodes Enlarged Anterior Bilaterally] : no anterior cervical lymphadenopathy [Normal Color / Pigmentation] : normal skin color and pigmentation [No Focal Deficits] : no focal deficits [Motor Exam] : the motor exam was normal [Oriented To Time, Place, And Person] : oriented to person, place, and time [de-identified] : obese

## 2022-10-04 LAB
ALBUMIN SERPL ELPH-MCNC: 4.7 G/DL
ALP BLD-CCNC: 101 U/L
ALT SERPL-CCNC: 27 U/L
AMYLASE/CREAT SERPL: 57 U/L
ANION GAP SERPL CALC-SCNC: 9 MMOL/L
AST SERPL-CCNC: 15 U/L
BASOPHILS # BLD AUTO: 0.03 K/UL
BASOPHILS NFR BLD AUTO: 0.6 %
BILIRUB SERPL-MCNC: 0.6 MG/DL
BUN SERPL-MCNC: 13 MG/DL
CALCIUM SERPL-MCNC: 9.6 MG/DL
CHLORIDE SERPL-SCNC: 104 MMOL/L
CO2 SERPL-SCNC: 26 MMOL/L
CREAT SERPL-MCNC: 1.02 MG/DL
EGFR: 98 ML/MIN/1.73M2
EOSINOPHIL # BLD AUTO: 0.25 K/UL
EOSINOPHIL NFR BLD AUTO: 5.4 %
GGT SERPL-CCNC: 29 U/L
GLUCOSE SERPL-MCNC: 94 MG/DL
HBV CORE IGG+IGM SER QL: NONREACTIVE
HBV SURFACE AB SER QL: REACTIVE
HBV SURFACE AG SER QL: NONREACTIVE
HCT VFR BLD CALC: 44.4 %
HCV RNA SERPL NAA+PROBE-LOG IU: NOT DETECTED LOGIU/ML
HEPATITIS A IGG ANTIBODY: NONREACTIVE
HEPC RNA INTERP: NOT DETECTED
HGB BLD-MCNC: 15.9 G/DL
IMM GRANULOCYTES NFR BLD AUTO: 0.6 %
LPL SERPL-CCNC: 30 U/L
LYMPHOCYTES # BLD AUTO: 1.44 K/UL
LYMPHOCYTES NFR BLD AUTO: 31.1 %
MAN DIFF?: NORMAL
MCHC RBC-ENTMCNC: 31 PG
MCHC RBC-ENTMCNC: 35.8 GM/DL
MCV RBC AUTO: 86.5 FL
MONOCYTES # BLD AUTO: 0.59 K/UL
MONOCYTES NFR BLD AUTO: 12.7 %
NEUTROPHILS # BLD AUTO: 2.29 K/UL
NEUTROPHILS NFR BLD AUTO: 49.6 %
PLATELET # BLD AUTO: 213 K/UL
POTASSIUM SERPL-SCNC: 4.7 MMOL/L
PROT SERPL-MCNC: 6.9 G/DL
RBC # BLD: 5.13 M/UL
RBC # FLD: 12.8 %
SODIUM SERPL-SCNC: 139 MMOL/L
TRIGL SERPL-MCNC: 199 MG/DL
WBC # FLD AUTO: 4.63 K/UL

## 2022-10-24 ENCOUNTER — OUTPATIENT (OUTPATIENT)
Dept: OUTPATIENT SERVICES | Facility: HOSPITAL | Age: 37
LOS: 1 days | End: 2022-10-24
Payer: MEDICAID

## 2022-10-24 ENCOUNTER — APPOINTMENT (OUTPATIENT)
Dept: ULTRASOUND IMAGING | Facility: IMAGING CENTER | Age: 37
End: 2022-10-24

## 2022-10-24 DIAGNOSIS — Z90.49 ACQUIRED ABSENCE OF OTHER SPECIFIED PARTS OF DIGESTIVE TRACT: Chronic | ICD-10-CM

## 2022-10-24 DIAGNOSIS — R10.11 RIGHT UPPER QUADRANT PAIN: ICD-10-CM

## 2022-10-24 PROCEDURE — 76700 US EXAM ABDOM COMPLETE: CPT | Mod: 26

## 2022-10-24 PROCEDURE — 76700 US EXAM ABDOM COMPLETE: CPT

## 2022-11-11 ENCOUNTER — NON-APPOINTMENT (OUTPATIENT)
Age: 37
End: 2022-11-11

## 2022-11-15 ENCOUNTER — RESULT REVIEW (OUTPATIENT)
Age: 37
End: 2022-11-15

## 2022-11-15 ENCOUNTER — APPOINTMENT (OUTPATIENT)
Dept: GASTROENTEROLOGY | Facility: HOSPITAL | Age: 37
End: 2022-11-15

## 2022-11-15 ENCOUNTER — NON-APPOINTMENT (OUTPATIENT)
Age: 37
End: 2022-11-15

## 2022-11-15 ENCOUNTER — OUTPATIENT (OUTPATIENT)
Dept: OUTPATIENT SERVICES | Facility: HOSPITAL | Age: 37
LOS: 1 days | End: 2022-11-15
Payer: MEDICAID

## 2022-11-15 ENCOUNTER — TRANSCRIPTION ENCOUNTER (OUTPATIENT)
Age: 37
End: 2022-11-15

## 2022-11-15 VITALS
DIASTOLIC BLOOD PRESSURE: 86 MMHG | OXYGEN SATURATION: 99 % | WEIGHT: 220.02 LBS | SYSTOLIC BLOOD PRESSURE: 131 MMHG | TEMPERATURE: 98 F | HEART RATE: 90 BPM | RESPIRATION RATE: 10 BRPM | HEIGHT: 70 IN

## 2022-11-15 VITALS
SYSTOLIC BLOOD PRESSURE: 108 MMHG | DIASTOLIC BLOOD PRESSURE: 77 MMHG | RESPIRATION RATE: 18 BRPM | OXYGEN SATURATION: 96 % | HEART RATE: 79 BPM

## 2022-11-15 DIAGNOSIS — K21.9 GASTRO-ESOPHAGEAL REFLUX DISEASE WITHOUT ESOPHAGITIS: ICD-10-CM

## 2022-11-15 DIAGNOSIS — Z90.49 ACQUIRED ABSENCE OF OTHER SPECIFIED PARTS OF DIGESTIVE TRACT: Chronic | ICD-10-CM

## 2022-11-15 PROCEDURE — 88305 TISSUE EXAM BY PATHOLOGIST: CPT

## 2022-11-15 PROCEDURE — 43259 EGD US EXAM DUODENUM/JEJUNUM: CPT

## 2022-11-15 PROCEDURE — 88305 TISSUE EXAM BY PATHOLOGIST: CPT | Mod: 26

## 2022-11-15 PROCEDURE — 43239 EGD BIOPSY SINGLE/MULTIPLE: CPT

## 2022-11-15 DEVICE — NET RETRV ROT ROTH 2.5MMX230CM: Type: IMPLANTABLE DEVICE | Status: FUNCTIONAL

## 2022-11-15 RX ORDER — SODIUM CHLORIDE 9 MG/ML
500 INJECTION INTRAMUSCULAR; INTRAVENOUS; SUBCUTANEOUS
Refills: 0 | Status: COMPLETED | OUTPATIENT
Start: 2022-11-15 | End: 2022-11-15

## 2022-11-15 RX ADMIN — SODIUM CHLORIDE 30 MILLILITER(S): 9 INJECTION INTRAMUSCULAR; INTRAVENOUS; SUBCUTANEOUS at 13:53

## 2022-11-15 NOTE — ASU DISCHARGE PLAN (ADULT/PEDIATRIC) - NS MD DC FALL RISK RISK
For information on Fall & Injury Prevention, visit: [email or web address] OR  [email or web address] OR  https://www.cdc.gov/steadi/patient.html

## 2022-11-15 NOTE — ASU PATIENT PROFILE, ADULT - FALL HARM RISK - UNIVERSAL INTERVENTIONS
Bed in lowest position, wheels locked, appropriate side rails in place/Call bell, personal items and telephone in reach/Instruct patient to call for assistance before getting out of bed or chair/Non-slip footwear when patient is out of bed/[place] to call system/Physically safe environment - no spills, clutter or unnecessary equipment/Purposeful Proactive Rounding/Room/bathroom lighting operational, light cord in reach

## 2022-11-15 NOTE — PRE PROCEDURE NOTE - PRE PROCEDURE EVALUATION
Attending Physician:  Dr. Elizalde                Procedure:  EGD EUS    Indication for Procedure: s/o cholecystectomy, abd pain  ________________________________________________________  PAST MEDICAL & SURGICAL HISTORY:  No pertinent past medical history      History of cholecystectomy        ALLERGIES:  No Known Allergies    HOME MEDICATIONS:  Multiple Vitamins oral tablet: 1 tab(s) orally once a day    AICD/PPM: [ ] yes   [x ] no    PERTINENT LAB DATA:                      PHYSICAL EXAMINATION:    Height (cm): 177.8  Weight (kg): 99.8  BMI (kg/m2): 31.6  BSA (m2): 2.17T(C): 36.5  HR: 90  BP: 131/86  RR: 10  SpO2: 99%    Constitutional: NAD  HEENT: PERRLA, EOMI,    Neck:  No JVD  Respiratory: CTAB/L  Cardiovascular: S1 and S2  Gastrointestinal: BS+, soft, NT/ND  Extremities: No peripheral edema  Neurological: A/O x 3, no focal deficits  Psychiatric: Normal mood, normal affect  Skin: No rashes    ASA Class: I [ ]  II [x ]  III [ ]  IV [ ]    COMMENTS:    The patient is a suitable candidate for the planned procedure unless box checked [ ]  No, explain:

## 2022-11-16 LAB — SURGICAL PATHOLOGY STUDY: SIGNIFICANT CHANGE UP

## 2022-12-12 ENCOUNTER — TRANSCRIPTION ENCOUNTER (OUTPATIENT)
Age: 37
End: 2022-12-12

## 2022-12-12 ENCOUNTER — APPOINTMENT (OUTPATIENT)
Dept: GASTROENTEROLOGY | Facility: CLINIC | Age: 37
End: 2022-12-12

## 2022-12-12 VITALS
SYSTOLIC BLOOD PRESSURE: 121 MMHG | TEMPERATURE: 97.6 F | HEIGHT: 70 IN | DIASTOLIC BLOOD PRESSURE: 82 MMHG | WEIGHT: 220 LBS | OXYGEN SATURATION: 95 % | BODY MASS INDEX: 31.5 KG/M2 | HEART RATE: 73 BPM

## 2022-12-12 PROCEDURE — 99213 OFFICE O/P EST LOW 20 MIN: CPT

## 2022-12-12 NOTE — PHYSICAL EXAM
[Alert] : alert [Normal Voice/Communication] : normal voice/communication [Healthy Appearing] : healthy appearing [No Acute Distress] : no acute distress [Sclera] : the sclera and conjunctiva were normal [Hearing Threshold Finger Rub Not [place]] : hearing was normal [Normal Lips/Gums] : the lips and gums were normal [Oropharynx] : the oropharynx was normal [Normal Appearance] : the appearance of the neck was normal [No Neck Mass] : no neck mass was observed [No Respiratory Distress] : no respiratory distress [No Acc Muscle Use] : no accessory muscle use [Respiration, Rhythm And Depth] : normal respiratory rhythm and effort [Auscultation Breath Sounds / Voice Sounds] : lungs were clear to auscultation bilaterally [Heart Rate And Rhythm] : heart rate was normal and rhythm regular [Normal S1, S2] : normal S1 and S2 [Murmurs] : no murmurs [Bowel Sounds] : normal bowel sounds [Abdomen Tenderness] : non-tender [No Masses] : no abdominal mass palpated [Abdomen Soft] : soft [] : no hepatosplenomegaly [Oriented To Time, Place, And Person] : oriented to person, place, and time [de-identified] : 1" palpable lump in laeft side abd wall

## 2022-12-12 NOTE — HISTORY OF PRESENT ILLNESS
[FreeTextEntry1] : CALI CLARKE 37 year HM came for follow up and discussion of recent EGD and biopsy findings.EUS showed normal Pancreas and CBD.\par Gastric Biopsy showed non specific gastritis and no Helicobacter Pylori .Patient reports improvement and relief of abdominal  bloating,fullness/discomfort and heartburn.He reports regular Bowel movements with no BPR or melena.\par He reported a fall 3 wks ago and went to [facility], xray chest neg for rib injury, mild tenderness and lump in left lat abd wall

## 2024-11-12 NOTE — PRE-ANESTHESIA EVALUATION ADULT - NSANTHOSAYNRD_GEN_A_CORE
No. FORTINO screening performed.  STOP BANG Legend: 0-2 = LOW Risk; 3-4 = INTERMEDIATE Risk; 5-8 = HIGH Risk
DASH/TLC (Sodium & Cholesterol Restricted)

## (undated) DEVICE — FORCEP RADIAL JAW 4 JUMBO 2.8MM 3.2MM 240CM ORANGE DISP

## (undated) DEVICE — FORCEP MULTIBITE MULTIPLE SAMPLE 2.4MM 2.8MM 240CM ORANGE DISP

## (undated) DEVICE — SYR ALLIANCE II INFLATION 60ML

## (undated) DEVICE — CATH IV SAFE BC 20G X 1.16" (PINK)

## (undated) DEVICE — SUCTION YANKAUER NO CONTROL VENT

## (undated) DEVICE — TUBING IV SET GRAVITY 3Y 100" MACRO

## (undated) DEVICE — SENSOR O2 FINGER ADULT

## (undated) DEVICE — SYR LUER LOK 50CC

## (undated) DEVICE — CLAMP BX HOT RAD JAW 3

## (undated) DEVICE — SOL INJ NS 0.9% 500ML 2 PORT

## (undated) DEVICE — TUBING SUCTION CONN 6FT STERILE

## (undated) DEVICE — IRRIGATOR BIO SHIELD

## (undated) DEVICE — FOLEY HOLDER STATLOCK 2 WAY ADULT

## (undated) DEVICE — CATH IV SAFE BC 22G X 1" (BLUE)

## (undated) DEVICE — BRUSH COLONOSCOPY CYTOLOGY

## (undated) DEVICE — PACK IV START WITH CHG

## (undated) DEVICE — BITE BLOCK ADULT 20 X 27MM (GREEN)

## (undated) DEVICE — CATH BLLN ULTRASONIC ENSOSCOPE

## (undated) DEVICE — BALLOON US ENDO

## (undated) DEVICE — BIOPSY FORCEP RADIAL JAW 4 STANDARD WITH NEEDLE

## (undated) DEVICE — TUBING SUCTION 20FT